# Patient Record
(demographics unavailable — no encounter records)

---

## 2024-10-24 NOTE — PHYSICAL EXAM
[No Acute Distress] : no acute distress [Well-Appearing] : well-appearing [Normal Sclera/Conjunctiva] : normal sclera/conjunctiva [PERRL] : pupils equal round and reactive to light [Normal Outer Ear/Nose] : the outer ears and nose were normal in appearance [Normal Oropharynx] : the oropharynx was normal [No JVD] : no jugular venous distention [No Lymphadenopathy] : no lymphadenopathy [No Respiratory Distress] : no respiratory distress  [No Accessory Muscle Use] : no accessory muscle use [Clear to Auscultation] : lungs were clear to auscultation bilaterally [Normal Rate] : normal rate  [Regular Rhythm] : with a regular rhythm [Normal S1, S2] : normal S1 and S2 [Soft] : abdomen soft [No HSM] : no HSM [Normal Posterior Cervical Nodes] : no posterior cervical lymphadenopathy [Normal Anterior Cervical Nodes] : no anterior cervical lymphadenopathy [No CVA Tenderness] : no CVA  tenderness [No Spinal Tenderness] : no spinal tenderness [No Joint Swelling] : no joint swelling [Grossly Normal Strength/Tone] : grossly normal strength/tone [No Rash] : no rash [Coordination Grossly Intact] : coordination grossly intact [No Focal Deficits] : no focal deficits [Normal Gait] : normal gait [Normal Affect] : the affect was normal [Normal Insight/Judgement] : insight and judgment were intact [No Edema] : there was no peripheral edema [Alert and Oriented x3] : oriented to person, place, and time

## 2024-10-24 NOTE — ASSESSMENT
[Patient Requires Further Testing] : Patient requires further testing [FreeTextEntry4] : I have advised the patient to avoid aspirin and anti inflammatories and all vitamins and supplements for one week prior to surgery. No CP, SOB  Mets>4 Ekg with no significant ST segment changes RCRI: Class II Medically Stable, may proceed with proposed procedure.w/u further work up. Risks and benefit of procedure discussed with patient. Recent labs reviewed and discussed with patient.

## 2024-10-24 NOTE — PLAN
[FreeTextEntry1] : will need cardiology to update note that patient cleared for surgery also will need INR/PT not done on repeta labs

## 2024-10-24 NOTE — HISTORY OF PRESENT ILLNESS
[(Patient denies any chest pain, claudication, dyspnea on exertion, orthopnea, palpitations or syncope)] : Patient denies any chest pain, claudication, dyspnea on exertion, orthopnea, palpitations or syncope [Moderate (4-6 METs)] : Moderate (4-6 METs) [No Pertinent Pulmonary History] : no history of asthma, COPD, sleep apnea, or smoking [No Adverse Anesthesia Reaction] : no adverse anesthesia reaction in self or family member [FreeTextEntry1] : [facility] [FreeTextEntry2] : 11/1/24 [FreeTextEntry3] : Dr Paez [FreeTextEntry4] : hx of HTN, Gout, Anxiety and Depression, BPH, inflammatory polyarthritis, NICOLETTE, psoriatic arthritis, tobacco use.  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

## 2024-11-14 NOTE — PHYSICAL EXAM
[Alert] : alert [Normal Voice/Communication] : normal voice/communication [Healthy Appearing] : healthy appearing [Sclera] : the sclera and conjunctiva were normal [Hearing Threshold Finger Rub Not [place]] : hearing was normal [Normal Lips/Gums] : the lips and gums were normal [Normal Appearance] : the appearance of the neck was normal [No Respiratory Distress] : no respiratory distress [Auscultation Breath Sounds / Voice Sounds] : lungs were clear to auscultation bilaterally [Heart Rate And Rhythm] : heart rate was normal and rhythm regular [Normal S1, S2] : normal S1 and S2 [Abdomen Tenderness] : non-tender [Bowel Sounds] : normal bowel sounds [Abdomen Soft] : soft [Abnormal Walk] : normal gait [Normal Color / Pigmentation] : normal skin color and pigmentation [Oriented To Time, Place, And Person] : oriented to person, place, and time

## 2024-11-14 NOTE — PHYSICAL EXAM
[Alert] : alert [Normal Voice/Communication] : normal voice/communication [Healthy Appearing] : healthy appearing [Sclera] : the sclera and conjunctiva were normal [Hearing Threshold Finger Rub Not [place]] : hearing was normal [Normal Lips/Gums] : the lips and gums were normal [Normal Appearance] : the appearance of the neck was normal [No Respiratory Distress] : no respiratory distress [Auscultation Breath Sounds / Voice Sounds] : lungs were clear to auscultation bilaterally [Heart Rate And Rhythm] : heart rate was normal and rhythm regular [Normal S1, S2] : normal S1 and S2 [Bowel Sounds] : normal bowel sounds [Abdomen Tenderness] : non-tender [Abdomen Soft] : soft [Abnormal Walk] : normal gait [Normal Color / Pigmentation] : normal skin color and pigmentation [Oriented To Time, Place, And Person] : oriented to person, place, and time

## 2024-11-16 NOTE — ASSESSMENT
[FreeTextEntry1] : 78-year-old male presents to the office for HFU. Pt presented to [facility] after becoming febrile once he had catheter removed from TURP. Pt was found to have significantly elevated liver enzymes. CT scan revealed 16mm pancreatic head lesion, mildly prominent zach hepatis and zach caval lymph nodes. Pt reports "generalized discomfort."   Plan:  Elevated liver enzymes: Will send pt for complete liver serology work up.  Incidental findings on CT: Will send pt MRI for further evaluation of pancreatic lesion. However, will also schedule pt for Endoscopic ultrasound. Risks of endoscopic evaluation of the luminal GI tract were discussed at length with patient, including, but not limited to bleeding, perforation, and delayed bleeding and perforation. Alternatives were discussed. Benefits of endoscopic ultrasound were discussed.  Colonoscopy: Pt due for repeat screening, will schedule now to be done as routine.  Risks versus benefits as well as instructions given. Pt agrees to planned procedure. Suprep to be given for preparation. All questions answered.   Pt to call with questions or concerns. Pt agrees to plan, all questions answered. Seen with Dr. Esteves.    I, Dr. Esteves, personally performed the evaluation and management (E/M) services for this new patient. That E/M includes conducting the initial examination, assessing all conditions, and establishing the plan of care. Today, my NPP, Sophia Gamboa, was here to observe my evaluation and management services for this patient to be followed going forward

## 2024-11-16 NOTE — HISTORY OF PRESENT ILLNESS
[FreeTextEntry1] : Go Cortez is a 78-year-old male with PMHx of BPH, Gout, HLD, and S/p TURP on 11/1 presents to the office for HFU. Pt presented to [facility] after becoming febrile once he had catheter removed from TURP. Pt was found to have significantly elevated liver enzymes. CT scan revealed 16mm pancreatic head lesion, mildly prominent zach hepatis and zach caval lymph nodes. Pt reports "generalized discomfort." Occasional nausea, no direct correlation to meals. Previous colonoscopy 15-20 years prior. Denies FMH of CRC or Pancreatic Ca. Denies bowel complaints, typically has bowel movements regularly without significant straining or overt bleeding such as melena or hematochezia. Denies upper GI symptoms such as GERD, vomiting, or dysphagia. Denies unintentional weight loss.

## 2024-11-16 NOTE — ASSESSMENT
[FreeTextEntry1] : 78-year-old male presents to the office for HFU. Pt presented to [facility] after becoming febrile once he had catheter removed from TURP. Pt was found to have significantly elevated liver enzymes. CT scan revealed 16mm pancreatic head lesion, mildly prominent zach hepatis and zach caval lymph nodes. Pt reports "generalized discomfort."   Plan:  Elevated liver enzymes: Will send pt for complete liver serology work up.  Incidental findings on CT: Will send pt MRI for further evaluation of pancreatic lesion. However, will also schedule pt for Endoscopic ultrasound. Risks of endoscopic evaluation of the luminal GI tract were discussed at length with patient, including, but not limited to bleeding, perforation, and delayed bleeding and perforation. Alternatives were discussed. Benefits of endoscopic ultrasound were discussed.  Colonoscopy: Pt due for repeat screening, will schedule now to be done as routine.  Risks versus benefits as well as instructions given. Pt agrees to planned procedure. Suprep to be given for preparation. All questions answered.   Pt to call with questions or concerns. Pt agrees to plan, all questions answered. Seen with Dr. Esteves.    I, Dr. Esteves, personally performed the evaluation and management (E/M) services for this new patient. That E/M includes conducting the initial examination, assessing all conditions, and establishing the plan of care. Today, my NPP, Sophia Gamboa, was here to observe my evaluation and management services for this patient to be followed going forward

## 2024-11-16 NOTE — REVIEW OF SYSTEMS
[Feeling Poorly] : feeling poorly [As Noted in HPI] : as noted in HPI [Abdominal Pain] : abdominal pain [Negative] : Heme/Lymph [Fever] : no fever [Chills] : no chills [Recent Weight Gain (___ Lbs)] : no recent weight gain [Recent Weight Loss (___ Lbs)] : no recent weight loss [Vomiting] : no vomiting [Constipation] : no constipation [Diarrhea] : no diarrhea [Heartburn] : no heartburn [Melena (black stool)] : no melena [Bleeding] : no bleeding [Fecal Incontinence (soiling)] : no fecal incontinence [Bloating (gassiness)] : no bloating

## 2024-11-16 NOTE — HISTORY OF PRESENT ILLNESS
[FreeTextEntry1] : Go Cortez is a 78-year-old male with PMHx of BPH, Gout, HLD, and S/p TURP on 11/1 presents to the office for HFU. Pt presented to [facility] after becoming febrile once he had catheter removed from TURP. Pt was found to have significantly elevated liver enzymes. CT scan revealed 16mm pancreatic head lesion, mildly prominent zach hepatis and zach caval lymph nodes. Pt reports "generalized discomfort." Occasional nausea, no direct correlation to meals. Previous colonoscopy 15-20 years prior. Denies FMH of CRC or Pancreatic Ca. Denies bowel complaints, typically has bowel movements regularly without significant straining or overt bleeding such as melena or hematochezia. Denies upper GI symptoms such as GERD, vomiting, or dysphagia. Denies unintentional weight loss.

## 2024-12-04 NOTE — PHYSICAL EXAM
[No Acute Distress] : no acute distress [Well Nourished] : well nourished [Well-Appearing] : well-appearing [Normal Sclera/Conjunctiva] : normal sclera/conjunctiva [EOMI] : extraocular movements intact [Normal Outer Ear/Nose] : the outer ears and nose were normal in appearance [No JVD] : no jugular venous distention [Supple] : supple [No Respiratory Distress] : no respiratory distress  [No Accessory Muscle Use] : no accessory muscle use [Clear to Auscultation] : lungs were clear to auscultation bilaterally [Normal Rate] : normal rate  [Regular Rhythm] : with a regular rhythm [Normal S1, S2] : normal S1 and S2 [No Murmur] : no murmur heard [No Carotid Bruits] : no carotid bruits [No Edema] : there was no peripheral edema [No Extremity Clubbing/Cyanosis] : no extremity clubbing/cyanosis [Soft] : abdomen soft [Non Tender] : non-tender [Non-distended] : non-distended [No Masses] : no abdominal mass palpated [No HSM] : no HSM [Normal Bowel Sounds] : normal bowel sounds [Normal Posterior Cervical Nodes] : no posterior cervical lymphadenopathy [Normal Anterior Cervical Nodes] : no anterior cervical lymphadenopathy [No CVA Tenderness] : no CVA  tenderness [No Rash] : no rash [Coordination Grossly Intact] : coordination grossly intact [Normal Gait] : normal gait [Normal Affect] : the affect was normal [Normal Insight/Judgement] : insight and judgment were intact [de-identified] : Mildly obese [de-identified] : The posterior pharynx is narrowed with a Mallampati score of 2-3

## 2024-12-04 NOTE — HISTORY OF PRESENT ILLNESS
[FreeTextEntry1] :  The patient comes in for a new patient evaluation. [de-identified] :  Mr. RAMOS is a 78 year old male with a history of HTN, HLD, BPH, psoriatic arthritis, NICOLETTE, and AFib. He presents today for an initial pulmonary evaluation.   The patient has a history of psoriatic arthritis for which he was previously maintained on Methotrexate. He is no longer maintained on this medication. He continues to follow with rheumatologist, Dr. Zarco, for treatment. He is stable in this regard.   The patient has a history of HTN for which he is maintained on Losartan 100 MG once daily.  He also has a history of Hyperlipidemia and continues to take Atorvastatin 20 MG once daily. He is generally tolerating the statin agent well, denying any severe muscle aches or any other overt symptoms. There has been no chest pain, shortness of breath, or PND. The patient continues to see cardiologist, Dr. Erickson. He reports that he exercises routinely.    The patient also has a history of BPH for which he is maintained on Finasteride 5 MG once daily. The patient reports that he is s/p a TURP procedure with Dr. Paez on 11/1/24. The patient developed a fever post-op and underwent testing to determine etiology.  A CAT scan of the abdomen was done, which revealed a pancreatic cyst. The patient was referred to Dr. Esteves, who recommended an MRI and BRANDI. The patient saw his cardiologist, Dr. Erickson, for cardiac clearance for the BRANDI.  At that time, the patient was c/o of SOB for about 2 to 3 days. It was found that the patient was in AFib, for which Dr. Erickson prescribed Eliquis 5 MG. The patient followed up with Dr. Erickson 1 week later, at which time the patient was back in normal sinus rhythm. Dr. Erickson referred the patient to me for a pulmonary evaluation as it was felt his AFib could have been induced by sleep apnea.   Of note, the patient does have a history of NICOLETTE, reporting he underwent an in-lab polysomnogram approximately 10 years ago. The study was positive for NICOLETTE, with an AHI of 21.3. The patient reports that he did obtain an AutoPAP device at 5-15 cm of water pressure, but was ultimately unable to tolerate the mask. The patient discontinued the device and has been untreated since. He does report that his wife states that he snores on occasion. He notes that he does dream on a regular basis. His energy levels are good throughout the course of the day. He denies daytime somnolence but does occasionally take naps. He does not fall asleep while driving. He does not snore when the mask is in place.  The remainder of the patient's pulmonary history is positive for cigarette and cigar smoking. He states that he smoked 2 packs per day for approximately 20 years, quitting at age 40.  He now smokes 1 to 2 cigars/day.  He states that he does not "inhale" the cigar smoke.  He denies a past history of pneumonia, asthma, TB, or TB exposure. He denies ankle swelling, paroxysmal nocturnal dyspnea, or orthopnea. He sleeps on 1 pillow. He has not traveled in the past 2 years. He denies exposure to toxic chemicals and/or fumes. He worked as a teacher.  There have been no cough, fevers, chills, or night sweats. There have been no other acute constitutional symptoms. He comes in for this assessment.

## 2024-12-04 NOTE — PLAN
[FreeTextEntry1] : 1. Continue current medications as outlined above.   2. The patient will undergo a polysomnogram in the near future to re-evaluate NICOLETTE.  Of note is the fact that he was unable to tolerate a full facemask in the past.  I have shown him examples of some of the newer appliances, including the nasal pillows, and he states that he would be willing to attempt to try 1 of these devices, if needed, instead.   3. Follow up in 6 months with spirometry.    4. Continue to follow with cardiologist, Dr. Erickson, for treatment of HTN, HLD, and AFib.   5. Continue to follow with rheumatologist, Dr. Zarco, for treatment of psoriatic arthritis.   6. Routine medical follow-up with his primary care physician, Dr. Boyle.    7. The COVID and Influenza vaccinations are recommended at this time.    8. Maintain exercise regimen as tolerated.

## 2024-12-04 NOTE — DATA REVIEWED
[FreeTextEntry1] : A pulmonary function test is performed.  Lung volumes reveal a normal total lung capacity and vital capacity.  The RV and FRC are both significantly reduced.  Lung mechanics reveal normal flow rates.  Minimal bronchodilator reactivity is demonstrated.  The DLCO and saturation are well-maintained.  This represents a mild degree of restriction.  Restriction is most likely due to the patient's mild centripetal obesity.

## 2024-12-04 NOTE — ADDENDUM
[FreeTextEntry1] : This note was written by Tess Mclean on 12/04/2024 acting as a medical scribe for Dr. Chao Do MD. All medical entries made by the scribe were at my, Dr. Chao Do's, direction and personally dictated by me on 12/04/2024. I have reviewed the chart and agree that the record accurately reflects my personal performance of the history, review of systems, assessment, and plan. I have also personally directed, reviewed, and agreed with the chart.

## 2025-01-07 NOTE — HISTORY OF PRESENT ILLNESS
[FreeTextEntry1] :  Follow Up Visit [de-identified] : KAYLIN RAMOS is a 78 year M who comes in for a follow up visit. Pt with hx of HTN, Gout, Anxiety and Depression, BPH, inflammatory polyarthritis, NICOLETTE, psoriatic arthritis, tobacco use.  Pt went for TURP on 11/1 and advised partially complete due to machine dysfunction with . After removing catheter pt became febrile, went to [facility] ED and admitted, found to have elevated LFTs, ct scan showed 16 mm pancreatic head lesion, prominent zach hepatis and zach caval lymph nodes. Per urology notes pt found to have acute prostatitis with cx negative and tx with Meropenam and discharged on cefdinir. Caplyta was stopped and LFTs down trending/normalizing. Pt advised to have MRI abdomen and Endoscopic ultrasound and colonoscopy. Pt went to [facility] for endoscopic ultrasound and advised to see cardiology. Pt found to be light headed and losartan cut back from 100 mg to 50 mg, but BP high again so put back on 100 mg losartan. PT found to be in Afibb by cardiology,  and started on Eliquis bid per cardiology and Echocardiogram done. Pt also referred to pulm medicine for NICOLETTE evaluation and had repeat sleep study this morning.  Patient denies any cp, sob, abdominal pain, nausea, vomiting, palpitations, fever, chills, constipation, diarrhea.

## 2025-01-07 NOTE — ASSESSMENT
[FreeTextEntry1] : 1. Anxiety and Depression: Now off Caplyta 42 mg cap due to SE of elevated LFTs, continue Paxil 37.5 mg daily and Klonopin 0.5 mg prn, continue follow-up with psychiatry  and speaking with therapist weekly. Counseling provided to the patient.  Advised to follow-up with psychiatry on additional antianxiety medication.  Recheck LFTs.  2.HTN: continue on losartan 100 mg daily. Check blood pressure daily, if greater than 150/90, call MD. Keep 2 gm low sodium diet, exercise as tolerated.  3.tobacco abuse: Currently smokes 3 to 4 cigars/day for the past 20 years, has had CT chest lung cancer screening many years ago, advised to repeat at this time.  Discussed following with pulmonary medicine.  4.paroxysmal atrial fibrillation: Now on Eliquis 5 mg twice daily, continue follow-up with cardiology .  Check TSH level.  5.obstructive sleep apnea: Follow-up with pulm medicine on results of recent sleep study.  6.hyperlipidemia: Recheck lipid panel, continue on atorvastatin 20 mg daily. Patient advised on low cholesterol diet-decrease in white carbs and exercise 150 minutes per week.

## 2025-06-11 NOTE — ADDENDUM
[FreeTextEntry1] : This note was written by Tess Mclean on 06/11/2025 acting as a medical scribe for Dr. Chao Do MD. All medical entries made by the scribe were at my, Dr. Chao Do's, direction and personally dictated by me on 06/11/2025. I have reviewed the chart and agree that the record accurately reflects my personal performance of the history, review of systems, assessment, and plan. I have also personally directed, reviewed, and agreed with the chart.     no

## 2025-06-11 NOTE — HISTORY OF PRESENT ILLNESS
[FreeTextEntry1] :  The patient comes in for a routine follow-up pulmonary evaluation.  [de-identified] : At the time of his inisital visit in December 2024, the patient was referred to me by Dr. Erickson  for a pulmonary evaluation as it was felt the patient's AFib could have been induced by sleep apnea. The patient does have a history of NICOLETTE, reporting he underwent an in-lab polysomnogram approximately 10 years ago. The study was positive for NICOLETTE, with an AHI of 21.3. The patient reports that he did obtain an AutoPAP device at 5-15 cm of water pressure, but was ultimately unable to tolerate the mask. The patient discontinued the device and had been untreated since.   At the time of his visit in December 2024, I ordered another polysomnogram. The patient underwent the polysomnogram in January 2025, which revealed with moderate obstructive sleep apnea, with an AHI of 23. The patient subsequently underwent a CPAP titration study, where he was found to require BiPAP at 22 over 17 cm of water pressure. The device was ordered for the patient.   At this time, the patient is feeling well from a pulmonary standpoint. He admits that he was unable to tolerate the BiPAP device. The patient states that he only wore the ordered BiPAP at 22/17 cm water pressure for a few nights before self-discontinuing the device.  He subsequently returned the device.  He has been untreated for this problem.   The patient remains on Eliquis 5 MG for treatment of AFib. There has been no chest pain, shortness of breath, palpitations, or PND. He has not recently followed with Dr. Erickson.  There have been no cough, fevers, chills, or night sweats.  He does have a pancreatic cyst, which is tentatively scheduled to be evaluated with an endoscopic ultrasound in the next few weeks, by Dr. Mccrary. There have been no other acute constitutional symptoms. He comes in for this assessment.

## 2025-06-11 NOTE — PHYSICAL EXAM
[No Acute Distress] : no acute distress [Well Nourished] : well nourished [No JVD] : no jugular venous distention [Supple] : supple [No Respiratory Distress] : no respiratory distress  [No Accessory Muscle Use] : no accessory muscle use [Clear to Auscultation] : lungs were clear to auscultation bilaterally [Normal Rate] : normal rate  [Regular Rhythm] : with a regular rhythm [Normal S1, S2] : normal S1 and S2 [No Edema] : there was no peripheral edema [No Extremity Clubbing/Cyanosis] : no extremity clubbing/cyanosis [Soft] : abdomen soft [Normal Bowel Sounds] : normal bowel sounds [Normal Supraclavicular Nodes] : no supraclavicular lymphadenopathy [Normal Posterior Cervical Nodes] : no posterior cervical lymphadenopathy [Normal Anterior Cervical Nodes] : no anterior cervical lymphadenopathy [No Rash] : no rash [Normal Affect] : the affect was normal [Normal Insight/Judgement] : insight and judgment were intact [de-identified] : The posterior pharynx is narrowed with a Mallampati score of 2-3

## 2025-06-11 NOTE — PHYSICAL EXAM
[No Acute Distress] : no acute distress [Well Nourished] : well nourished [No JVD] : no jugular venous distention [Supple] : supple [No Respiratory Distress] : no respiratory distress  [No Accessory Muscle Use] : no accessory muscle use [Clear to Auscultation] : lungs were clear to auscultation bilaterally [Normal Rate] : normal rate  [Regular Rhythm] : with a regular rhythm [Normal S1, S2] : normal S1 and S2 [No Edema] : there was no peripheral edema [No Extremity Clubbing/Cyanosis] : no extremity clubbing/cyanosis [Soft] : abdomen soft [Normal Bowel Sounds] : normal bowel sounds [Normal Supraclavicular Nodes] : no supraclavicular lymphadenopathy [Normal Posterior Cervical Nodes] : no posterior cervical lymphadenopathy [Normal Anterior Cervical Nodes] : no anterior cervical lymphadenopathy [No Rash] : no rash [Normal Affect] : the affect was normal [Normal Insight/Judgement] : insight and judgment were intact [de-identified] : The posterior pharynx is narrowed with a Mallampati score of 2-3

## 2025-06-11 NOTE — ADDENDUM
[FreeTextEntry1] : This note was written by Tess Mclean on 06/11/2025 acting as a medical scribe for Dr. Chao Do MD. All medical entries made by the scribe were at my, Dr. Chao Do's, direction and personally dictated by me on 06/11/2025. I have reviewed the chart and agree that the record accurately reflects my personal performance of the history, review of systems, assessment, and plan. I have also personally directed, reviewed, and agreed with the chart.

## 2025-06-11 NOTE — PLAN
[FreeTextEntry1] : 1. Continue current medications as outlined above.   2. Follow up in 6 months with wellness and routine fasting bloodwork.  He has now switched to this office for all primary care moving forward.  To this end, we will update his vaccines, colonoscopy status, and dermatologic assessments.   3. Continue to follow with cardiologist, Dr. Erickson, for treatment of HTN, HLD, and AFib.  4. The patient has been referred to Dr. Russel Akins for sleep consultation regarding other options for NICOLETTE treatment including an oral mandibular advancement device, and possibly Inspire.   5. The Influenza and COVID vaccines are recommended in the fall.   6. Cardiovascular exercise as tolerated.

## 2025-06-11 NOTE — HISTORY OF PRESENT ILLNESS
[FreeTextEntry1] :  The patient comes in for a routine follow-up pulmonary evaluation.  [de-identified] : At the time of his inisital visit in December 2024, the patient was referred to me by Dr. Erickson  for a pulmonary evaluation as it was felt the patient's AFib could have been induced by sleep apnea. The patient does have a history of NICOLETTE, reporting he underwent an in-lab polysomnogram approximately 10 years ago. The study was positive for NICOLETTE, with an AHI of 21.3. The patient reports that he did obtain an AutoPAP device at 5-15 cm of water pressure, but was ultimately unable to tolerate the mask. The patient discontinued the device and had been untreated since.   At the time of his visit in December 2024, I ordered another polysomnogram. The patient underwent the polysomnogram in January 2025, which revealed with moderate obstructive sleep apnea, with an AHI of 23. The patient subsequently underwent a CPAP titration study, where he was found to require BiPAP at 22 over 17 cm of water pressure. The device was ordered for the patient.   At this time, the patient is feeling well from a pulmonary standpoint. He admits that he was unable to tolerate the BiPAP device. The patient states that he only wore the ordered BiPAP at 22/17 cm water pressure for a few nights before self-discontinuing the device.  He subsequently returned the device.  He has been untreated for this problem.   The patient remains on Eliquis 5 MG for treatment of AFib. There has been no chest pain, shortness of breath, palpitations, or PND. He has not recently followed with Dr. Erickson.  There have been no cough, fevers, chills, or night sweats.  He does have a pancreatic cyst, which is tentatively scheduled to be evaluated with an endoscopic ultrasound in the next few weeks, by Dr. Mccrary. There have been no other acute constitutional symptoms. He comes in for this assessment.